# Patient Record
Sex: FEMALE | Race: BLACK OR AFRICAN AMERICAN | NOT HISPANIC OR LATINO
[De-identification: names, ages, dates, MRNs, and addresses within clinical notes are randomized per-mention and may not be internally consistent; named-entity substitution may affect disease eponyms.]

---

## 2017-02-16 PROBLEM — Z00.00 ENCOUNTER FOR PREVENTIVE HEALTH EXAMINATION: Status: ACTIVE | Noted: 2017-02-16

## 2017-02-23 ENCOUNTER — APPOINTMENT (OUTPATIENT)
Dept: HEART AND VASCULAR | Facility: CLINIC | Age: 57
End: 2017-02-23

## 2017-02-23 ENCOUNTER — OUTPATIENT (OUTPATIENT)
Dept: OUTPATIENT SERVICES | Facility: HOSPITAL | Age: 57
LOS: 1 days | End: 2017-02-23
Payer: MEDICAID

## 2017-02-23 ENCOUNTER — OUTPATIENT (OUTPATIENT)
Dept: OUTPATIENT SERVICES | Facility: HOSPITAL | Age: 57
LOS: 1 days | End: 2017-02-23

## 2017-02-23 VITALS — BODY MASS INDEX: 35.38 KG/M2 | WEIGHT: 220.13 LBS | HEIGHT: 66 IN

## 2017-02-23 VITALS — RESPIRATION RATE: 16 BRPM | HEART RATE: 78 BPM | DIASTOLIC BLOOD PRESSURE: 80 MMHG | SYSTOLIC BLOOD PRESSURE: 138 MMHG

## 2017-02-23 DIAGNOSIS — R07.9 CHEST PAIN, UNSPECIFIED: ICD-10-CM

## 2017-02-23 DIAGNOSIS — I10 ESSENTIAL (PRIMARY) HYPERTENSION: ICD-10-CM

## 2017-02-23 PROCEDURE — G0202: CPT | Mod: 26

## 2017-02-23 PROCEDURE — 77067 SCR MAMMO BI INCL CAD: CPT

## 2017-03-16 ENCOUNTER — FORM ENCOUNTER (OUTPATIENT)
Age: 57
End: 2017-03-16

## 2017-03-17 ENCOUNTER — OUTPATIENT (OUTPATIENT)
Dept: OUTPATIENT SERVICES | Facility: HOSPITAL | Age: 57
LOS: 1 days | End: 2017-03-17
Payer: MEDICAID

## 2017-03-17 DIAGNOSIS — R07.9 CHEST PAIN, UNSPECIFIED: ICD-10-CM

## 2017-03-17 PROCEDURE — A9500: CPT

## 2017-03-17 PROCEDURE — A9505: CPT

## 2017-03-17 PROCEDURE — 93017 CV STRESS TEST TRACING ONLY: CPT

## 2017-03-17 PROCEDURE — 93016 CV STRESS TEST SUPVJ ONLY: CPT

## 2017-03-17 PROCEDURE — 78452 HT MUSCLE IMAGE SPECT MULT: CPT

## 2017-03-17 PROCEDURE — 93018 CV STRESS TEST I&R ONLY: CPT

## 2017-03-17 PROCEDURE — 78452 HT MUSCLE IMAGE SPECT MULT: CPT | Mod: 26

## 2018-04-02 ENCOUNTER — EMERGENCY (EMERGENCY)
Facility: HOSPITAL | Age: 58
LOS: 1 days | Discharge: LEFT BEFORE DISCHARGE | End: 2018-04-02
Admitting: EMERGENCY MEDICINE

## 2018-04-02 VITALS
HEART RATE: 87 BPM | DIASTOLIC BLOOD PRESSURE: 87 MMHG | RESPIRATION RATE: 18 BRPM | SYSTOLIC BLOOD PRESSURE: 144 MMHG | OXYGEN SATURATION: 99 % | WEIGHT: 220.02 LBS | TEMPERATURE: 97 F

## 2018-04-02 DIAGNOSIS — I10 ESSENTIAL (PRIMARY) HYPERTENSION: ICD-10-CM

## 2018-04-02 NOTE — ED ADULT TRIAGE NOTE - OTHER COMPLAINTS
Reports symptoms are intermittent and started 3 days ago. Denies any nausea, vomiting, visual changes or chest pain.

## 2018-06-08 ENCOUNTER — EMERGENCY (EMERGENCY)
Facility: HOSPITAL | Age: 58
LOS: 1 days | Discharge: HOME | End: 2018-06-08
Admitting: EMERGENCY MEDICINE
Payer: COMMERCIAL

## 2018-06-08 VITALS
TEMPERATURE: 98 F | HEIGHT: 67 IN | DIASTOLIC BLOOD PRESSURE: 82 MMHG | SYSTOLIC BLOOD PRESSURE: 124 MMHG | RESPIRATION RATE: 18 BRPM | WEIGHT: 218.04 LBS | HEART RATE: 93 BPM | OXYGEN SATURATION: 100 %

## 2018-06-08 LAB — HIV 1+2 AB+HIV1 P24 AG SERPL QL IA: SIGNIFICANT CHANGE UP

## 2018-06-08 PROCEDURE — 36415 COLL VENOUS BLD VENIPUNCTURE: CPT

## 2018-06-08 PROCEDURE — 99284 EMERGENCY DEPT VISIT MOD MDM: CPT

## 2018-06-08 PROCEDURE — 87389 HIV-1 AG W/HIV-1&-2 AB AG IA: CPT

## 2018-06-08 PROCEDURE — 99283 EMERGENCY DEPT VISIT LOW MDM: CPT

## 2018-06-08 RX ORDER — SERTRALINE 25 MG/1
1 TABLET, FILM COATED ORAL
Qty: 14 | Refills: 0 | OUTPATIENT
Start: 2018-06-08 | End: 2018-06-21

## 2018-06-08 RX ORDER — QUETIAPINE FUMARATE 200 MG/1
1 TABLET, FILM COATED ORAL
Qty: 28 | Refills: 0 | OUTPATIENT
Start: 2018-06-08 | End: 2018-06-21

## 2018-06-08 NOTE — ED PROVIDER NOTE - NS ED ROS FT
CONSTITUTIONAL: No fever, chills, or weakness  NEURO: No headache, no dizziness, no syncope; No focal weakness/tingling/numbness  EYES: No visual changes  ENT: Mild runny nose. No sore throat.  PULM: No cough or dyspnea  CV: No chest pain or palpitations  GI: No abdominal pain, vomiting, or diarrhea  : No dysuria, hematuria, frequency  MSK: No neck pain or back pain, no joint pain  SKIN: no rash or unusual bruising

## 2018-06-08 NOTE — ED PROVIDER NOTE - OBJECTIVE STATEMENT
pt with reported hx of bipolar disorder requesting medication refill of quetiapine 50 mg BID and sertraline 100 mg daily.  She says she ran out of her medications about 8 days ago; she went to her doctor's office but she says the doctor is away and she did not find out when he will return.  She denies having any SI/HI/auditory or visual hallucinations, though she admits to having some anxiety.  She is asking if we can switch her medication to something she saw advertised on TV ([place]?).  She has no other complaints.

## 2018-06-08 NOTE — ED ADULT NURSE NOTE - OBJECTIVE STATEMENT
pt received in [facility] A & O x 3 pt states " my doctor is out of town and I need a refill for my Zoloft and Seroquel "

## 2018-06-08 NOTE — ED PROVIDER NOTE - MEDICAL DECISION MAKING DETAILS
Pt requesting refill of psych meds.  No s/s of acute psychosis or danger to self/others.  Will give 2 week supply until she can get a refill from her physician.

## 2018-06-08 NOTE — ED PROVIDER NOTE - PHYSICAL EXAMINATION
GEN: awake, alert, NAD  EYES: Clear, Pupils equal and round.  ENT: No rhinorrhea on exam.  PULM: Lungs clear  CV: RRR S1S2  GI: Abd soft, nontender  MSK: DING without difficulty  SKIN: normal color and turgor, no rash  NEURO: Alert, oriented x 3. DING normally.  Speech clear.  Gait steady.  PSYCH: Normal affect.  Mood: mildly anxious.  Denies SI, HI, and hallucinations.

## 2018-06-11 DIAGNOSIS — Z79.899 OTHER LONG TERM (CURRENT) DRUG THERAPY: ICD-10-CM

## 2018-06-11 DIAGNOSIS — Z76.0 ENCOUNTER FOR ISSUE OF REPEAT PRESCRIPTION: ICD-10-CM

## 2019-03-30 ENCOUNTER — EMERGENCY (EMERGENCY)
Facility: HOSPITAL | Age: 59
LOS: 1 days | Discharge: HOME | End: 2019-03-30
Attending: EMERGENCY MEDICINE | Admitting: EMERGENCY MEDICINE
Payer: MEDICARE

## 2019-03-30 VITALS
TEMPERATURE: 98 F | OXYGEN SATURATION: 100 % | SYSTOLIC BLOOD PRESSURE: 130 MMHG | HEART RATE: 77 BPM | DIASTOLIC BLOOD PRESSURE: 74 MMHG | WEIGHT: 238.1 LBS | RESPIRATION RATE: 18 BRPM

## 2019-03-30 VITALS
OXYGEN SATURATION: 100 % | DIASTOLIC BLOOD PRESSURE: 60 MMHG | SYSTOLIC BLOOD PRESSURE: 118 MMHG | HEART RATE: 83 BPM | TEMPERATURE: 98 F | RESPIRATION RATE: 18 BRPM

## 2019-03-30 LAB
ALBUMIN SERPL ELPH-MCNC: 3.9 G/DL — SIGNIFICANT CHANGE UP (ref 3.3–5)
ALP SERPL-CCNC: 94 U/L — SIGNIFICANT CHANGE UP (ref 40–120)
ALT FLD-CCNC: 14 U/L — SIGNIFICANT CHANGE UP (ref 10–45)
ANION GAP SERPL CALC-SCNC: 11 MMOL/L — SIGNIFICANT CHANGE UP (ref 5–17)
APTT BLD: 34.5 SEC — SIGNIFICANT CHANGE UP (ref 27.5–36.3)
AST SERPL-CCNC: 21 U/L — SIGNIFICANT CHANGE UP (ref 10–40)
BASOPHILS # BLD AUTO: 0.03 K/UL — SIGNIFICANT CHANGE UP (ref 0–0.2)
BASOPHILS NFR BLD AUTO: 0.5 % — SIGNIFICANT CHANGE UP (ref 0–2)
BILIRUB SERPL-MCNC: 0.4 MG/DL — SIGNIFICANT CHANGE UP (ref 0.2–1.2)
BUN SERPL-MCNC: 13 MG/DL — SIGNIFICANT CHANGE UP (ref 7–23)
CALCIUM SERPL-MCNC: 9.1 MG/DL — SIGNIFICANT CHANGE UP (ref 8.4–10.5)
CHLORIDE SERPL-SCNC: 105 MMOL/L — SIGNIFICANT CHANGE UP (ref 96–108)
CO2 SERPL-SCNC: 26 MMOL/L — SIGNIFICANT CHANGE UP (ref 22–31)
CREAT SERPL-MCNC: 0.76 MG/DL — SIGNIFICANT CHANGE UP (ref 0.5–1.3)
EOSINOPHIL # BLD AUTO: 0.1 K/UL — SIGNIFICANT CHANGE UP (ref 0–0.5)
EOSINOPHIL NFR BLD AUTO: 1.6 % — SIGNIFICANT CHANGE UP (ref 0–6)
GLUCOSE SERPL-MCNC: 110 MG/DL — HIGH (ref 70–99)
HCT VFR BLD CALC: 31.8 % — LOW (ref 34.5–45)
HGB BLD-MCNC: 9.8 G/DL — LOW (ref 11.5–15.5)
IMM GRANULOCYTES NFR BLD AUTO: 0.3 % — SIGNIFICANT CHANGE UP (ref 0–1.5)
INR BLD: 1.07 — SIGNIFICANT CHANGE UP (ref 0.88–1.16)
LYMPHOCYTES # BLD AUTO: 1.67 K/UL — SIGNIFICANT CHANGE UP (ref 1–3.3)
LYMPHOCYTES # BLD AUTO: 26.1 % — SIGNIFICANT CHANGE UP (ref 13–44)
MCHC RBC-ENTMCNC: 27.3 PG — SIGNIFICANT CHANGE UP (ref 27–34)
MCHC RBC-ENTMCNC: 30.8 GM/DL — LOW (ref 32–36)
MCV RBC AUTO: 88.6 FL — SIGNIFICANT CHANGE UP (ref 80–100)
MONOCYTES # BLD AUTO: 0.69 K/UL — SIGNIFICANT CHANGE UP (ref 0–0.9)
MONOCYTES NFR BLD AUTO: 10.8 % — SIGNIFICANT CHANGE UP (ref 2–14)
NEUTROPHILS # BLD AUTO: 3.89 K/UL — SIGNIFICANT CHANGE UP (ref 1.8–7.4)
NEUTROPHILS NFR BLD AUTO: 60.7 % — SIGNIFICANT CHANGE UP (ref 43–77)
NRBC # BLD: 0 /100 WBCS — SIGNIFICANT CHANGE UP (ref 0–0)
NT-PROBNP SERPL-SCNC: 18 PG/ML — SIGNIFICANT CHANGE UP (ref 0–300)
PLATELET # BLD AUTO: 378 K/UL — SIGNIFICANT CHANGE UP (ref 150–400)
POTASSIUM SERPL-MCNC: 4.2 MMOL/L — SIGNIFICANT CHANGE UP (ref 3.5–5.3)
POTASSIUM SERPL-SCNC: 4.2 MMOL/L — SIGNIFICANT CHANGE UP (ref 3.5–5.3)
PROT SERPL-MCNC: 8.2 G/DL — SIGNIFICANT CHANGE UP (ref 6–8.3)
PROTHROM AB SERPL-ACNC: 12.1 SEC — SIGNIFICANT CHANGE UP (ref 10–12.9)
RBC # BLD: 3.59 M/UL — LOW (ref 3.8–5.2)
RBC # FLD: 15.9 % — HIGH (ref 10.3–14.5)
SODIUM SERPL-SCNC: 142 MMOL/L — SIGNIFICANT CHANGE UP (ref 135–145)
WBC # BLD: 6.4 K/UL — SIGNIFICANT CHANGE UP (ref 3.8–10.5)
WBC # FLD AUTO: 6.4 K/UL — SIGNIFICANT CHANGE UP (ref 3.8–10.5)

## 2019-03-30 PROCEDURE — 85610 PROTHROMBIN TIME: CPT

## 2019-03-30 PROCEDURE — 71046 X-RAY EXAM CHEST 2 VIEWS: CPT

## 2019-03-30 PROCEDURE — 83880 ASSAY OF NATRIURETIC PEPTIDE: CPT

## 2019-03-30 PROCEDURE — 85730 THROMBOPLASTIN TIME PARTIAL: CPT

## 2019-03-30 PROCEDURE — 99284 EMERGENCY DEPT VISIT MOD MDM: CPT | Mod: 25

## 2019-03-30 PROCEDURE — 80053 COMPREHEN METABOLIC PANEL: CPT

## 2019-03-30 PROCEDURE — 71046 X-RAY EXAM CHEST 2 VIEWS: CPT | Mod: 26

## 2019-03-30 PROCEDURE — 99283 EMERGENCY DEPT VISIT LOW MDM: CPT | Mod: 25

## 2019-03-30 PROCEDURE — 36415 COLL VENOUS BLD VENIPUNCTURE: CPT

## 2019-03-30 PROCEDURE — 85025 COMPLETE CBC W/AUTO DIFF WBC: CPT

## 2019-03-30 NOTE — ED ADULT TRIAGE NOTE - CHIEF COMPLAINT QUOTE
pt complaining of b/l lower extremity swelling pain and tingling x 1 week with chest pain yesterday denies SOB N/V reports she has not been sleeping well and wants to talk with someone about it

## 2019-03-30 NOTE — ED PROVIDER NOTE - OBJECTIVE STATEMENT
60yo F hx of bipolar (not on meds), homeless sleeping in a shelter, here w/ complaint of bilateral leg swelling x 2 weeks. sleeping in a chair at shelter, has not elevated legs as not able to. walks all day. Also feels slight chest discomfort - worried about TB as pt is in a shelter. Denies radiation of pain, SOB, cough, immobilization, recent surgeries. No associated diaphoresis, N/V, decreased exercise capacity. Has a PCP that she follows up with but states is overdue to go back, trying to get her social situation rectified first. 60yo F hx of bipolar (not on meds), homeless sleeping in a shelter, here w/ complaint of bilateral leg swelling x 2 weeks. sleeping in a chair at shelter, has not elevated legs as not able to. walks all day. Also feels slight chest discomfort - worried about TB as pt is in a shelter. Denies radiation of pain, SOB, cough, immobilization, recent surgeries. No associated diaphoresis, N/V, decreased exercise capacity. Not pleuritic. Has a PCP that she follows up with but states is overdue to go back, trying to get her social situation rectified first.

## 2019-03-30 NOTE — ED PROVIDER NOTE - PHYSICAL EXAMINATION
CONSTITUTIONAL: Well-appearing; well-nourished; in no apparent distress.   HEAD: Normocephalic; atraumatic.   EYES:  conjunctiva and sclera clear  ENT: normal nose; no rhinorrhea; normal pharynx with no erythema or lesions.   NECK: Supple; non-tender;   CARDIOVASCULAR: Normal S1, S2; no murmurs, rubs, or gallops. Regular rate and rhythm.   RESPIRATORY: Breathing easily; breath sounds clear and equal bilaterally; no wheezes, rhonchi, or rales.  GI: Soft; non-distended; non-tender; no palpable organomegaly.   EXT: No cyanosis or edema; N/V intact  SKIN: Normal for age and race; warm; dry; good turgor; no apparent lesions or rash.   NEURO: A & O x 3; face symmetric; grossly unremarkable.   PSYCHOLOGICAL: The patient’s mood and manner are appropriate. CONSTITUTIONAL: Well-appearing; well-nourished; in no apparent distress.   HEAD: Normocephalic; atraumatic.   EYES:  conjunctiva and sclera clear  ENT: normal nose; no rhinorrhea; normal pharynx with no erythema or lesions.   NECK: Supple; non-tender;   CARDIOVASCULAR: Normal S1, S2; no murmurs, rubs, or gallops. Regular rate and rhythm.   RESPIRATORY: Breathing easily; breath sounds clear and equal bilaterally; no wheezes, rhonchi, or rales.  GI: Soft; non-distended; non-tender; no palpable organomegaly.   EXT: No cyanosis or erythema, bilateral legs w/ non pitting edema; N/V intact  SKIN: Normal for age and race; warm; dry; good turgor; no apparent lesions or rash.   NEURO: A & O x 3; face symmetric; grossly unremarkable.   PSYCHOLOGICAL: The patient’s mood and manner are appropriate.

## 2019-03-30 NOTE — ED PROVIDER NOTE - CLINICAL SUMMARY MEDICAL DECISION MAKING FREE TEXT BOX
here w/ chest discomfort and bilateral leg swelling. ekg wnl. trop and bnp neg. will check cxr and if neg, has a pcp she can follow up with. doubt acs as this time as has no risk factors and no other associated symptoms concerning for acs. in terms of social situation - has an appt with social work on monday.

## 2019-03-30 NOTE — ED PROVIDER NOTE - NSFOLLOWUPINSTRUCTIONS_ED_ALL_ED_FT
Peripheral Edema  Peripheral edema is swelling that is caused by a buildup of fluid. Peripheral edema most often affects the lower legs, ankles, and feet. It can also develop in the arms, hands, and face. The area of the body that has peripheral edema will look swollen. It may also feel heavy or warm. Your clothes may start to feel tight. Pressing on the area may make a temporary dent in your skin. You may not be able to move your arm or leg as much as usual.    There are many causes of peripheral edema. It can be a complication of other diseases, such as congestive heart failure, kidney disease, or a problem with your blood circulation. It also can be a side effect of certain medicines. It often happens to women during pregnancy. Sometimes, the cause is not known. Treating the underlying condition is often the only treatment for peripheral edema.    Follow these instructions at home:  Pay attention to any changes in your symptoms. Take these actions to help with your discomfort:    Raise (elevate) your legs while you are sitting or lying down.  Move around often to prevent stiffness and to lessen swelling. Do not sit or stand for long periods of time.  Wear support stockings as told by your health care provider.  Follow instructions from your health care provider about limiting salt (sodium) in your diet. Sometimes eating less salt can reduce swelling.  Take over-the-counter and prescription medicines only as told by your health care provider. Your health care provider may prescribe medicine to help your body get rid of excess water (diuretic).  Keep all follow-up visits as told by your health care provider. This is important.    Contact a health care provider if:  You have a fever.  Your edema starts suddenly or is getting worse, especially if you are pregnant or have a medical condition.  You have swelling in only one leg.  You have increased swelling and pain in your legs.  Get help right away if:  You develop shortness of breath, especially when you are lying down.  You have pain in your chest or abdomen.  You feel weak.  You faint.

## 2019-03-30 NOTE — ED ADULT NURSE NOTE - OBJECTIVE STATEMENT
Patient presents to the ED complaining of bilateral lower leg and feet swelling for the past week. Denies any fever or chills. Lower legs noted to be edematous 3+. Denies any fever or chills. Reports a cough. Hx of bipolar, does not take medication for two years, patient states that she is trying to treat it by herself. Denies SI/HI.

## 2019-04-03 DIAGNOSIS — R60.0 LOCALIZED EDEMA: ICD-10-CM

## 2019-04-03 DIAGNOSIS — M79.89 OTHER SPECIFIED SOFT TISSUE DISORDERS: ICD-10-CM

## 2019-04-03 DIAGNOSIS — Z79.899 OTHER LONG TERM (CURRENT) DRUG THERAPY: ICD-10-CM

## 2019-05-08 ENCOUNTER — EMERGENCY (EMERGENCY)
Facility: HOSPITAL | Age: 59
LOS: 1 days | Discharge: HOME | End: 2019-05-08
Attending: EMERGENCY MEDICINE | Admitting: EMERGENCY MEDICINE
Payer: MEDICARE

## 2019-05-08 VITALS
HEIGHT: 67 IN | WEIGHT: 218.04 LBS | OXYGEN SATURATION: 99 % | DIASTOLIC BLOOD PRESSURE: 81 MMHG | RESPIRATION RATE: 18 BRPM | HEART RATE: 80 BPM | SYSTOLIC BLOOD PRESSURE: 157 MMHG | TEMPERATURE: 98 F

## 2019-05-08 DIAGNOSIS — R19.7 DIARRHEA, UNSPECIFIED: ICD-10-CM

## 2019-05-08 DIAGNOSIS — R11.0 NAUSEA: ICD-10-CM

## 2019-05-08 DIAGNOSIS — Z79.899 OTHER LONG TERM (CURRENT) DRUG THERAPY: ICD-10-CM

## 2019-05-08 DIAGNOSIS — R10.13 EPIGASTRIC PAIN: ICD-10-CM

## 2019-05-08 DIAGNOSIS — R10.9 UNSPECIFIED ABDOMINAL PAIN: ICD-10-CM

## 2019-05-08 DIAGNOSIS — R35.0 FREQUENCY OF MICTURITION: ICD-10-CM

## 2019-05-08 DIAGNOSIS — Z98.890 OTHER SPECIFIED POSTPROCEDURAL STATES: ICD-10-CM

## 2019-05-08 PROCEDURE — 99284 EMERGENCY DEPT VISIT MOD MDM: CPT | Mod: 25

## 2019-05-08 NOTE — ED ADULT TRIAGE NOTE - ARRIVAL INFO ADDITIONAL COMMENTS
pt c/o 2 weeks of intermittent abd pain.  one episode of vomiting in the beginning now only occasional nausea.  also c/o hoarse voice that started today.

## 2019-05-08 NOTE — ED ADULT NURSE NOTE - NSIMPLEMENTINTERV_GEN_ALL_ED
Implemented All Universal Safety Interventions:  [place] to call system. Call bell, personal items and telephone within reach. Instruct patient to call for assistance. Room bathroom lighting operational. Non-slip footwear when patient is off stretcher. Physically safe environment: no spills, clutter or unnecessary equipment. Stretcher in lowest position, wheels locked, appropriate side rails in place.

## 2019-05-09 VITALS
SYSTOLIC BLOOD PRESSURE: 150 MMHG | OXYGEN SATURATION: 99 % | RESPIRATION RATE: 18 BRPM | HEART RATE: 82 BPM | DIASTOLIC BLOOD PRESSURE: 79 MMHG | TEMPERATURE: 98 F

## 2019-05-09 DIAGNOSIS — Z98.84 BARIATRIC SURGERY STATUS: Chronic | ICD-10-CM

## 2019-05-09 PROBLEM — F31.9 BIPOLAR DISORDER, UNSPECIFIED: Chronic | Status: ACTIVE | Noted: 2019-03-30

## 2019-05-09 LAB
ALBUMIN SERPL ELPH-MCNC: 4 G/DL — SIGNIFICANT CHANGE UP (ref 3.3–5)
ALP SERPL-CCNC: 97 U/L — SIGNIFICANT CHANGE UP (ref 40–120)
ALT FLD-CCNC: 11 U/L — SIGNIFICANT CHANGE UP (ref 10–45)
ANION GAP SERPL CALC-SCNC: 10 MMOL/L — SIGNIFICANT CHANGE UP (ref 5–17)
AST SERPL-CCNC: 26 U/L — SIGNIFICANT CHANGE UP (ref 10–40)
BASOPHILS # BLD AUTO: 0.02 K/UL — SIGNIFICANT CHANGE UP (ref 0–0.2)
BASOPHILS NFR BLD AUTO: 0.3 % — SIGNIFICANT CHANGE UP (ref 0–2)
BILIRUB SERPL-MCNC: 0.3 MG/DL — SIGNIFICANT CHANGE UP (ref 0.2–1.2)
BUN SERPL-MCNC: 13 MG/DL — SIGNIFICANT CHANGE UP (ref 7–23)
CALCIUM SERPL-MCNC: 9.1 MG/DL — SIGNIFICANT CHANGE UP (ref 8.4–10.5)
CHLORIDE SERPL-SCNC: 104 MMOL/L — SIGNIFICANT CHANGE UP (ref 96–108)
CO2 SERPL-SCNC: 27 MMOL/L — SIGNIFICANT CHANGE UP (ref 22–31)
CREAT SERPL-MCNC: 0.92 MG/DL — SIGNIFICANT CHANGE UP (ref 0.5–1.3)
EOSINOPHIL # BLD AUTO: 0.08 K/UL — SIGNIFICANT CHANGE UP (ref 0–0.5)
EOSINOPHIL NFR BLD AUTO: 1.1 % — SIGNIFICANT CHANGE UP (ref 0–6)
GLUCOSE SERPL-MCNC: 130 MG/DL — HIGH (ref 70–99)
HCT VFR BLD CALC: 33.4 % — LOW (ref 34.5–45)
HGB BLD-MCNC: 10.3 G/DL — LOW (ref 11.5–15.5)
IMM GRANULOCYTES NFR BLD AUTO: 0.3 % — SIGNIFICANT CHANGE UP (ref 0–1.5)
LIDOCAIN IGE QN: 19 U/L — SIGNIFICANT CHANGE UP (ref 7–60)
LYMPHOCYTES # BLD AUTO: 2.34 K/UL — SIGNIFICANT CHANGE UP (ref 1–3.3)
LYMPHOCYTES # BLD AUTO: 31 % — SIGNIFICANT CHANGE UP (ref 13–44)
MCHC RBC-ENTMCNC: 27.2 PG — SIGNIFICANT CHANGE UP (ref 27–34)
MCHC RBC-ENTMCNC: 30.8 GM/DL — LOW (ref 32–36)
MCV RBC AUTO: 88.1 FL — SIGNIFICANT CHANGE UP (ref 80–100)
MONOCYTES # BLD AUTO: 0.88 K/UL — SIGNIFICANT CHANGE UP (ref 0–0.9)
MONOCYTES NFR BLD AUTO: 11.7 % — SIGNIFICANT CHANGE UP (ref 2–14)
NEUTROPHILS # BLD AUTO: 4.21 K/UL — SIGNIFICANT CHANGE UP (ref 1.8–7.4)
NEUTROPHILS NFR BLD AUTO: 55.6 % — SIGNIFICANT CHANGE UP (ref 43–77)
NRBC # BLD: 0 /100 WBCS — SIGNIFICANT CHANGE UP (ref 0–0)
PLATELET # BLD AUTO: 367 K/UL — SIGNIFICANT CHANGE UP (ref 150–400)
POTASSIUM SERPL-MCNC: 4.1 MMOL/L — SIGNIFICANT CHANGE UP (ref 3.5–5.3)
POTASSIUM SERPL-SCNC: 4.1 MMOL/L — SIGNIFICANT CHANGE UP (ref 3.5–5.3)
PROT SERPL-MCNC: 7.7 G/DL — SIGNIFICANT CHANGE UP (ref 6–8.3)
RBC # BLD: 3.79 M/UL — LOW (ref 3.8–5.2)
RBC # FLD: 16.2 % — HIGH (ref 10.3–14.5)
SODIUM SERPL-SCNC: 141 MMOL/L — SIGNIFICANT CHANGE UP (ref 135–145)
WBC # BLD: 7.55 K/UL — SIGNIFICANT CHANGE UP (ref 3.8–10.5)
WBC # FLD AUTO: 7.55 K/UL — SIGNIFICANT CHANGE UP (ref 3.8–10.5)

## 2019-05-09 PROCEDURE — 85025 COMPLETE CBC W/AUTO DIFF WBC: CPT

## 2019-05-09 PROCEDURE — 74177 CT ABD & PELVIS W/CONTRAST: CPT

## 2019-05-09 PROCEDURE — 36415 COLL VENOUS BLD VENIPUNCTURE: CPT

## 2019-05-09 PROCEDURE — 83690 ASSAY OF LIPASE: CPT

## 2019-05-09 PROCEDURE — 80053 COMPREHEN METABOLIC PANEL: CPT

## 2019-05-09 PROCEDURE — 74177 CT ABD & PELVIS W/CONTRAST: CPT | Mod: 26

## 2019-05-09 PROCEDURE — 99284 EMERGENCY DEPT VISIT MOD MDM: CPT

## 2019-05-09 RX ORDER — IOHEXOL 300 MG/ML
30 INJECTION, SOLUTION INTRAVENOUS ONCE
Refills: 0 | Status: COMPLETED | OUTPATIENT
Start: 2019-05-09 | End: 2019-05-09

## 2019-05-09 RX ORDER — OMEPRAZOLE 10 MG/1
1 CAPSULE, DELAYED RELEASE ORAL
Qty: 14 | Refills: 0
Start: 2019-05-09 | End: 2019-05-22

## 2019-05-09 RX ADMIN — IOHEXOL 30 MILLILITER(S): 300 INJECTION, SOLUTION INTRAVENOUS at 00:51

## 2019-05-09 NOTE — ED PROVIDER NOTE - NSFOLLOWUPCLINICS_GEN_ALL_ED_FT
[facility]  Family Medicine  [facility]. [address]  [place], [place] [facility]  Phone: [phone]  Fax:   Follow Up Time:

## 2019-05-09 NOTE — ED PROVIDER NOTE - CLINICAL SUMMARY MEDICAL DECISION MAKING FREE TEXT BOX
56 yo female with hx of gastric bypass with int abd pain, nausea, diarrhea for 3 days.  will check labs, ct abd 56 yo female with hx of gastric bypass with int abd pain, nausea, diarrhea for 3 days.  will check labs, ct abd--Offered admission and stressed follow up for pancreatic studies -> states feeling improved

## 2019-05-09 NOTE — ED PROVIDER NOTE - OBJECTIVE STATEMENT
about 3 days of int upper abd pain, nausea  also diarrhea  no fever  hx of gastric bypass 11 years ago

## 2019-07-09 ENCOUNTER — EMERGENCY (EMERGENCY)
Facility: HOSPITAL | Age: 59
LOS: 1 days | Discharge: HOME | End: 2019-07-09
Attending: EMERGENCY MEDICINE | Admitting: EMERGENCY MEDICINE
Payer: MEDICARE

## 2019-07-09 VITALS
HEIGHT: 67 IN | SYSTOLIC BLOOD PRESSURE: 155 MMHG | DIASTOLIC BLOOD PRESSURE: 82 MMHG | TEMPERATURE: 98 F | OXYGEN SATURATION: 100 % | WEIGHT: 229.06 LBS | RESPIRATION RATE: 16 BRPM | HEART RATE: 81 BPM

## 2019-07-09 VITALS
RESPIRATION RATE: 16 BRPM | SYSTOLIC BLOOD PRESSURE: 147 MMHG | HEART RATE: 77 BPM | DIASTOLIC BLOOD PRESSURE: 80 MMHG | OXYGEN SATURATION: 98 % | TEMPERATURE: 98 F

## 2019-07-09 DIAGNOSIS — Z98.84 BARIATRIC SURGERY STATUS: Chronic | ICD-10-CM

## 2019-07-09 LAB
APPEARANCE UR: CLEAR — SIGNIFICANT CHANGE UP
BILIRUB UR-MCNC: NEGATIVE — SIGNIFICANT CHANGE UP
COLOR SPEC: YELLOW — SIGNIFICANT CHANGE UP
DIFF PNL FLD: NEGATIVE — SIGNIFICANT CHANGE UP
GLUCOSE UR QL: NEGATIVE — SIGNIFICANT CHANGE UP
KETONES UR-MCNC: NEGATIVE — SIGNIFICANT CHANGE UP
LEUKOCYTE ESTERASE UR-ACNC: NEGATIVE — SIGNIFICANT CHANGE UP
NITRITE UR-MCNC: NEGATIVE — SIGNIFICANT CHANGE UP
PH UR: 6 — SIGNIFICANT CHANGE UP (ref 5–8)
PROT UR-MCNC: NEGATIVE MG/DL — SIGNIFICANT CHANGE UP
SP GR SPEC: 1.02 — SIGNIFICANT CHANGE UP (ref 1–1.03)
UROBILINOGEN FLD QL: 1 E.U./DL — SIGNIFICANT CHANGE UP

## 2019-07-09 PROCEDURE — 81003 URINALYSIS AUTO W/O SCOPE: CPT

## 2019-07-09 PROCEDURE — 99283 EMERGENCY DEPT VISIT LOW MDM: CPT

## 2019-07-09 PROCEDURE — 87086 URINE CULTURE/COLONY COUNT: CPT

## 2019-07-09 NOTE — ED PROVIDER NOTE - PROGRESS NOTE DETAILS
performed external gu exam with nurse present pt now wants to give urine sample, states she needs 10 minutes, will sign out to pradeep ro and dr ashby to f/u on results.

## 2019-07-09 NOTE — ED PROVIDER NOTE - PHYSICAL EXAMINATION
gen: pt is sleeping,   HEENT: oropharynx clear  Neck: supple, no meningismus, no bruit noted bl carotid  CV: rrr no m/r/g 2+ radial pulse  Resp: ctab, no w/c/r  Abd: nontender, no rebound/guarding  : nl external genitalia, pt declined speculum examination no e/o prolapse.   Ext: no edema, pedal pulses 2+  Neuro: alert and oriented, cn grossly intact, strength equal in all 4 ext, sensation intact to light touch f/a/l, nl coordination, gait steady gen: pt is sleeping,   HEENT: oropharynx clear  Neck: supple, no meningismus, no bruit noted bl carotid  CV: rrr no m/r/g 2+ radial pulse  Resp: ctab, no w/c/r  Abd: nontender, no rebound/guarding  : nl external genitalia, no erythema no lesions no rash no mass no discharge no e/o prolapse  pt declined speculum examination no e/o prolapse.   Ext: no edema, pedal pulses 2+  Neuro: alert and oriented, cn grossly intact, strength equal in all 4 ext, sensation intact to light touch f/a/l, nl coordination, gait steady

## 2019-07-09 NOTE — ED PROVIDER NOTE - OBJECTIVE STATEMENT
59F with hx of bipolar mood d/o s/p gastric bypass, had CT scan 2 months ago that showed concern for pancreatic mass presenting with "mass to my vagina" no vaginal discharge, no dysuria, no hematuria, pt has not followed up with gynecology although she states that "this problem has been going on for a long time". "The mass comes and then it disappears. No vaginal bleeding, no recent LMP, pt is post-menopausal.

## 2019-07-09 NOTE — ED ADULT NURSE NOTE - NSIMPLEMENTINTERV_GEN_ALL_ED
Implemented All Universal Safety Interventions:  [place] to call system. Call bell, personal items and telephone within reach. Instruct patient to call for assistance. Room bathroom lighting operational. Non-slip footwear when patient is off stretcher. Physically safe environment: no spills, clutter or unnecessary equipment. Stretcher in lowest position, wheels locked, appropriate side rails in place.

## 2019-07-09 NOTE — ED ADULT NURSE NOTE - OBJECTIVE STATEMENT
Presents to ED for vaginal pain. Presents to ED for vaginal pain due to mass.  Patient reports, "I have the mass in my vagina that comes and then it disappears.  It's been happening for a long time."  Denies any vaginal discharge, dysuria, hematuria, vaginal bleeding, CP, SOB, abd pain, n/v, fevers, or chills.  Patient found sleeping in stretcher comfortably with no signs or symptoms of distress.  When asked for urine sample patient reported using bathroom in waiting room and could not produce another sample at this time.  Warm water was given to patient by MD and RN.  Patient found sleeping comfortably every time encountered for urine sample.

## 2019-07-09 NOTE — ED PROVIDER NOTE - CLINICAL SUMMARY MEDICAL DECISION MAKING FREE TEXT BOX
59F with vaginal pain for months, describes mass at times that goes back in can consider prolapse, abd nontender, pt was eating in waiting room, abd nontender no nausea, no vomiting, feels fine. plan for gyn f/u, will dc home. Pt is refusing to give urine sample, refusing internal vaginal exam, wants to sleep in ER.

## 2019-07-09 NOTE — ED PROVIDER NOTE - NSFOLLOWUPINSTRUCTIONS_ED_ALL_ED_FT
Follow up with gynecology regarding possible vaginal prolapse, return to the ER for any fever, vomiting, or abdominal pain.

## 2019-07-09 NOTE — ED ADULT NURSE NOTE - DOES PATIENT HAVE ADVANCE DIRECTIVE
Quality 431: Preventive Care And Screening: Unhealthy Alcohol Use - Screening: Patient screened for unhealthy alcohol use using a single question and scores less than 2 times per year
Quality 265: Biopsy Follow-Up: Biopsy results reviewed, communicated, tracked, and documented
Quality 226: Preventive Care And Screening: Tobacco Use: Screening And Cessation Intervention: Patient screened for tobacco and never smoked
Detail Level: Detailed
No

## 2019-07-10 LAB
CULTURE RESULTS: SIGNIFICANT CHANGE UP
SPECIMEN SOURCE: SIGNIFICANT CHANGE UP

## 2019-07-13 DIAGNOSIS — R10.2 PELVIC AND PERINEAL PAIN: ICD-10-CM

## 2019-07-13 DIAGNOSIS — Z79.899 OTHER LONG TERM (CURRENT) DRUG THERAPY: ICD-10-CM

## 2019-07-24 ENCOUNTER — EMERGENCY (EMERGENCY)
Facility: HOSPITAL | Age: 59
LOS: 1 days | Discharge: HOME | End: 2019-07-24
Attending: EMERGENCY MEDICINE | Admitting: EMERGENCY MEDICINE
Payer: MEDICARE

## 2019-07-24 VITALS
HEART RATE: 90 BPM | DIASTOLIC BLOOD PRESSURE: 83 MMHG | RESPIRATION RATE: 16 BRPM | WEIGHT: 220.02 LBS | TEMPERATURE: 98 F | OXYGEN SATURATION: 99 % | HEIGHT: 67 IN | SYSTOLIC BLOOD PRESSURE: 153 MMHG

## 2019-07-24 DIAGNOSIS — Z98.84 BARIATRIC SURGERY STATUS: Chronic | ICD-10-CM

## 2019-07-24 PROCEDURE — 99283 EMERGENCY DEPT VISIT LOW MDM: CPT

## 2019-07-24 PROCEDURE — 99282 EMERGENCY DEPT VISIT SF MDM: CPT

## 2019-07-24 RX ORDER — QUETIAPINE FUMARATE 200 MG/1
50 TABLET, FILM COATED ORAL ONCE
Refills: 0 | Status: COMPLETED | OUTPATIENT
Start: 2019-07-24 | End: 2019-07-24

## 2019-07-24 NOTE — ED PROVIDER NOTE - PHYSICAL EXAMINATION
GEN: Well appearing, well nourished, awake, alert, oriented to person, place, time/situation and in no apparent distress.  ENT: Airway patent, Nasal mucosa clear. Mouth with normal mucosa.  EYES: Clear bilaterally.  RESPIRATORY: Breathing comfortably with normal RR.  CARDIAC: Regular rate and rhythm  ABDOMEN: Soft, nontender, +bowel sounds, no rebound, rigidity, or guarding.  MSK: Range of motion is not limited, no deformities noted.  NEURO: Alert and oriented, no focal deficits.  SKIN: Skin normal color for race, warm, dry and intact. No evidence of rash.  PSYCH: Alert and oriented to person, place, time/situation. somewhat anxious mood and affect. Somewhat paranoid. Fluent speech. Cooperative. No psychosis. no apparent risk to self or others.

## 2019-07-24 NOTE — ED PROVIDER NOTE - NSFOLLOWUPCLINICS_GEN_ALL_ED_FT
[facility]  Family Medicine  [facility]. [address]  [place], [place] [facility]  Phone: [phone]  Fax:   Follow Up Time:

## 2019-07-24 NOTE — ED ADULT TRIAGE NOTE - CHIEF COMPLAINT QUOTE
Presents to ED for psych evaluation.  Patient reports she has questions regarding "something that happened to her,"  but refused to disclose what occurred.  Patient denies SI/HI, auditory/visual/tactile hallucination.  No signs or symptoms of distress at this time.
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
## 2019-07-24 NOTE — ED PROVIDER NOTE - NSFOLLOWUPINSTRUCTIONS_ED_ALL_ED_FT
Please follow up with your primary care physician. If you have any problem getting an appointment this week, please call the ED Referral Coordinator at [phone].  Return to the Emergency Department if you have any new or worsening symptoms, or for any other concerns. Please read below for further information.  Living With Bipolar Disorder  If you have been diagnosed with bipolar disorder, you may be relieved that you now know why you have felt or behaved a certain way. You may also feel overwhelmed about the treatment ahead, how to get the support you need, and how to deal with the condition day-to-day. With care and support, you can learn to manage your symptoms and live with bipolar disorder.    How to manage lifestyle changes  Managing stress     ImageStress is your body's reaction to life changes and events, both good and bad. Stress can play a major role in bipolar disorder, so it is important to learn how to cope with stress. Some techniques to cope with stress include:  Meditation, muscle relaxation, and breathing exercises.  Exercise. Even a short daily walk can help to lower stress levels.  Getting enough good-quality sleep. Too little sleep can cause gerardo to start (can trigger gerardo).  Making a schedule to manage your time. Knowing your daily schedule can help to keep you from feeling overwhelmed by tasks and deadlines.  Spending time on hobbies that you enjoy.  Medicines     Your health care provider may suggest certain medicines if he or she feels that they will help improve your condition. Avoid using caffeine, alcohol, and other substances that may prevent your medicines from working properly (may interact). It is also important to:  Talk with your pharmacist or health care provider about all the medicines that you take, their possible side effects, and which medicines are safe to take together.  Make it your goal to take part in all treatment decisions (shared decision-making). Ask about possible side effects of medicines that your health care provider recommends, and tell him or her how you feel about having those side effects. It is best if shared decision-making with your health care provider is part of your total treatment plan.  If you are taking medicines as part of your treatment, do not stop taking medicines before you ask your health care provider if it is safe to stop. You may need to have the medicine slowly decreased (tapered) over time to decrease the risk of harmful side effects.    Relationships    ImageSpend time with people that you trust and with whom you feel a sense of understanding and calm. Try to find friends or family members who make you feel safe and can help you control feelings of gerardo. Consider going to couples counseling, family education classes, or family therapy to:  Educate your loved ones about your condition and offer suggestions about how they can support you.  Help resolve conflicts.  Help develop communication skills in your relationships.  How to recognize changes in your condition  Everyone responds differently to treatment for bipolar disorder. Some signs that your condition is improving include:  Leveling of your mood. You may have less anger and excitement about daily activities, and your low moods may not be as bad.  Your symptoms being less intense.  Feeling calm more often.  Thinking clearly.  Not experiencing consequences for extreme behavior.  Feeling like your life is settling down.  Your behavior seeming more normal to you and to other people.  Some signs that your condition may be getting worse include:  Sleep problems.  Moods cycling between deep lows and unusually high (excess) energy.  Extreme emotions.  More anger at loved ones.  Staying away from others (isolating yourself).  A feeling of power or superiority.  Completing a lot of tasks in a very short amount of time.  Unusual thoughts and behaviors.  Suicidal thoughts.  Where to find support  Talking to others     Try making a list of the people you may want to tell about your condition, such as the people you trust most.  Plan what you are willing to talk about and what you do not want to discuss. Think about your needs ahead of time, and how your friends and family members can support you.  Let your loved ones know when they can share advice and when you would just like them to listen.   Give your loved ones information about bipolar disorder, and encourage them to learn about the condition.  Finances     Not all insurance plans cover mental health care, so it is important to check with your insurance carrier. If paying for co-pays or counseling services is a problem, search for a local or [facility] mental health care center. Public mental health care services may be offered there at a low cost or no cost when you are not able to see a private health care provider.    If you are taking medicine for depression, you may be able to get the generic form, which may be less expensive than brand-name medicine. Some makers of prescription medicines also offer help to patients who cannot afford the medicines they need.    Follow these instructions at home:  Medicines     Take over-the-counter and prescription medicines only as told by your health care provider or pharmacist.  Ask your pharmacist what over-the-counter cold medicines you should avoid. Some medicines can make symptoms worse.  General instructions     Ask for support from trusted family members or friends to make sure you stay on track with your treatment.  Keep a journal to write down your daily moods, medicines, sleep habits, and life events. This may help you have more success with your treatment.  Make and follow a routine for daily meal times. Eat healthy foods, such as whole grains, vegetables, and fresh fruit.  Try to go to sleep and wake up around the same time every day.  Keep all follow-up visits as told by your health care provider. This is important.  Questions to ask your health care provider:  If you are taking medicines:  How long do I need to take medicine?  Are there any long-term side effects of my medicine?  Are there any alternatives to taking medicine?  How would I benefit from therapy?  How often should I follow up with a health care provider?  Contact a health care provider if:  Your symptoms get worse or they do not get better with treatment.  Get help right away if:  You have thoughts about harming yourself or others.  If you ever feel like you may hurt yourself or others, or have thoughts about taking your own life, get help right away. You can go to your nearest emergency department or call:   Your local emergency services (911 in the [place]   A suicide crisis helpline, such as the National Suicide Prevention Lifeline at 1-423.863.6876. This is open 24-hours a day.   Summary  Learning ways to deal with stress can help to calm you and may also help your treatment work better.  There is a wide range of medicines that can help to treat bipolar disorder.  Having healthy relationships can help to make your moods more stable.  Contact a health care provider if your symptoms get worse or they do not get better with treatment.  This information is not intended to replace advice given to you by your health care provider. Make sure you discuss any questions you have with your health care provider.

## 2019-07-24 NOTE — ED PROVIDER NOTE - CLINICAL SUMMARY MEDICAL DECISION MAKING FREE TEXT BOX
59F with above PMHX who p/w anxiety over loss of belongings and living situation. She has no physical complaints. She is not psychotic, no SI/HI. Pt was given resources for shelter and  clinic for follow up. home dose of risperidone given. Pt is stable for DC. pt agreeable to the discharge plan.  At this time, the evidence for any other entities in the differential is insufficient to justify any further testing. This was discussed and explained to the patient. It was advised that persistent or worsening symptoms would require further evaluation. This was discussed with the patient and family using shared decision making. ED evaluation and management discussed in detail. Pt does not require emergent psych eval based on her presentation. Close PMD follow up encouraged.  Strict ED return instructions discussed in detail and patient given the opportunity to ask any questions about their discharge diagnosis and instructions. Patient verbalized understanding.

## 2019-07-28 DIAGNOSIS — F31.89 OTHER BIPOLAR DISORDER: ICD-10-CM

## 2019-07-28 DIAGNOSIS — Z79.899 OTHER LONG TERM (CURRENT) DRUG THERAPY: ICD-10-CM

## 2019-07-28 DIAGNOSIS — F41.9 ANXIETY DISORDER, UNSPECIFIED: ICD-10-CM

## 2019-08-12 ENCOUNTER — EMERGENCY (EMERGENCY)
Facility: HOSPITAL | Age: 59
LOS: 1 days | Discharge: HOME | End: 2019-08-12
Admitting: EMERGENCY MEDICINE
Payer: MEDICARE

## 2019-08-12 VITALS
HEART RATE: 74 BPM | TEMPERATURE: 98 F | OXYGEN SATURATION: 99 % | RESPIRATION RATE: 19 BRPM | SYSTOLIC BLOOD PRESSURE: 111 MMHG | DIASTOLIC BLOOD PRESSURE: 69 MMHG

## 2019-08-12 VITALS
TEMPERATURE: 98 F | HEART RATE: 88 BPM | RESPIRATION RATE: 16 BRPM | WEIGHT: 220.02 LBS | DIASTOLIC BLOOD PRESSURE: 71 MMHG | SYSTOLIC BLOOD PRESSURE: 127 MMHG | OXYGEN SATURATION: 98 %

## 2019-08-12 DIAGNOSIS — Z98.84 BARIATRIC SURGERY STATUS: Chronic | ICD-10-CM

## 2019-08-12 PROCEDURE — 99283 EMERGENCY DEPT VISIT LOW MDM: CPT

## 2019-08-12 RX ORDER — FAMOTIDINE 10 MG/ML
1 INJECTION INTRAVENOUS
Qty: 5 | Refills: 0
Start: 2019-08-12 | End: 2019-08-16

## 2019-08-12 RX ORDER — FAMOTIDINE 10 MG/ML
20 INJECTION INTRAVENOUS ONCE
Refills: 0 | Status: COMPLETED | OUTPATIENT
Start: 2019-08-12 | End: 2019-08-12

## 2019-08-12 RX ORDER — DIPHENHYDRAMINE HCL 50 MG
1 CAPSULE ORAL
Qty: 15 | Refills: 0
Start: 2019-08-12 | End: 2019-08-16

## 2019-08-12 RX ORDER — DIPHENHYDRAMINE HCL 50 MG
25 CAPSULE ORAL ONCE
Refills: 0 | Status: COMPLETED | OUTPATIENT
Start: 2019-08-12 | End: 2019-08-12

## 2019-08-12 RX ADMIN — FAMOTIDINE 20 MILLIGRAM(S): 10 INJECTION INTRAVENOUS at 02:43

## 2019-08-12 RX ADMIN — Medication 25 MILLIGRAM(S): at 02:43

## 2019-08-12 NOTE — ED ADULT TRIAGE NOTE - CHIEF COMPLAINT QUOTE
pt complaining of itching to b/l arms and head x 2 days with dry skin and rash no hives or pain no known allergies new detergents or medications no bug bites

## 2019-08-12 NOTE — ED PROVIDER NOTE - CLINICAL SUMMARY MEDICAL DECISION MAKING FREE TEXT BOX
Patient with  pruritus no focal rash or lesion. Patent airway and CTAB. Recommend stop cream and f/u with allergist.

## 2019-08-12 NOTE — ED PROVIDER NOTE - NS ED MD EM SELECTION
Chief complaint:   Chief Complaint   Patient presents with   • New Patient       Vitals:  Visit Vitals  /68   Pulse 84   Temp 97.4 °F (36.3 °C) (Oral)   Resp 16   Wt 85.8 kg   BMI 29.63 kg/m²       HISTORY OF PRESENT ILLNESS     This is a 87-year-old gentleman here to see me in referral from Dr. Robins. This patient has intermittent tenderness in the right groin. The family thinks he might have had a hernia surgery before but do not know which side. He is not the best historian. He is awake and alert he uses a walker. He denies any chest pain or shortness of breath. He denies any recent MI. He has a history of irritable bowel. Denies rectal bleeding.      Other significant problems:  Patient Active Problem List    Diagnosis Date Noted   • Gait instability 01/10/2019     Priority: Low   • Abnormal gait 02/24/2016     Priority: Low   • SDH (subdural hematoma) (CMS/[facility]) 02/24/2016     Priority: Low   • TBI (traumatic brain injury) (CMS/[facility]) 02/24/2016     Priority: Low       PAST MEDICAL, FAMILY AND SOCIAL HISTORY     Medications:  Current Outpatient Medications   Medication   • polyethylene glycol (MIRALAX) powder   • aspirin 81 MG chewable tablet   • atorvastatin (LIPITOR) 10 MG tablet   • azelastine (ASTELIN) 0.1 % nasal spray   • diclofenac (VOLTAREN) 1 % gel   • fluticasone (FLONASE) 50 MCG/ACT nasal spray   • methylPREDNISolone (MEDROL) 4 MG tablet   • methylPREDNISolone (MEDROL) 4 MG tablet   • methylPREDNISolone (MEDROL) 4 MG tablet   • saccharomyces boulardii (FLORASTOR) 250 MG capsule   • cefUROXime (CEFTIN) 500 MG tablet     No current facility-administered medications for this visit.        Allergies:  ALLERGIES:  No Known Allergies    Past Medical  History/Surgeries:  Past Medical History:   Diagnosis Date   • Arthritis        Past Surgical History:   Procedure Laterality Date   • Hernia repair     • Hip surgery     • Joint replacement      bilat hips       Family History:  No family history on  file.    Social History:  Social History     Tobacco Use   • Smoking status: Never Smoker   • Smokeless tobacco: Never Used   Substance Use Topics   • Alcohol use: No       REVIEW OF SYSTEMS     Review of Systems   Constitutional: Negative.    HENT: Negative.    Eyes: Negative.    Respiratory: Negative.    Gastrointestinal: Positive for diarrhea. Negative for abdominal distention, abdominal pain, anal bleeding, blood in stool and constipation.   Genitourinary: Negative.    Musculoskeletal: Positive for gait problem. Negative for arthralgias and back pain.   Hematological: Negative.    Psychiatric/Behavioral: Negative.        PHYSICAL EXAM     Physical Exam   Constitutional: He is oriented to person, place, and time. He appears well-nourished.   Cardiovascular: Normal rate and regular rhythm.   Pulmonary/Chest: Effort normal. No respiratory distress. He has no wheezes. He has no rales. He exhibits no tenderness.   Abdominal: Soft. He exhibits no distension and no mass. There is no tenderness. There is no rebound and no guarding.   In the right groin he has a tender hernia. With a fair amount of pressure unable to reduce the hernia. The CT scan is reviewed on the CT scan he has a right inguinal hernia with a possible bowel loop. I could not visualize any obvious scar.   Musculoskeletal: Normal range of motion.   Neurological: He is alert and oriented to person, place, and time.   Skin: Skin is warm and dry.   Psychiatric: He has a normal mood and affect.   Vitals reviewed.      ASSESSMENT/PLAN     Symptomatic right inguinal hernia with the temporary incarceration. I would recommend a right inguinal hernia repair. This could be a recurrent hernia. We will attempt to repair this with a MAC anesthesia and local. He will need preoperative clearance with his primary physician. Risks and benefits surgery were discussed including risk of bleeding infection scarring and pain.   [address] Exp Problem Focused - Mod. Complex

## 2019-08-12 NOTE — ED ADULT NURSE NOTE - NSIMPLEMENTINTERV_GEN_ALL_ED
Implemented All Universal Safety Interventions:  [place] to call system. Call bell, personal items and telephone within reach. Instruct patient to call for assistance. Room bathroom lighting operational. Non-slip footwear when patient is off stretcher. Physically safe environment: no spills, clutter or unnecessary equipment. Stretcher in lowest position, wheels locked, appropriate side rails in place.

## 2019-08-12 NOTE — ED PROVIDER NOTE - OBJECTIVE STATEMENT
60 y/o f with of bipolar disorder present to ED c/o pruritus for two days all over. Admit to also being pruritic also at the scalp. Possible new cream she reported  but unsure why her scalp itches. Denies new living accommodation, travel, new food, detergent, construction. Admit to no rash.

## 2019-08-15 DIAGNOSIS — L29.9 PRURITUS, UNSPECIFIED: ICD-10-CM

## 2019-09-27 ENCOUNTER — EMERGENCY (EMERGENCY)
Facility: HOSPITAL | Age: 59
LOS: 1 days | Discharge: HOME | End: 2019-09-27
Attending: EMERGENCY MEDICINE | Admitting: EMERGENCY MEDICINE
Payer: MEDICARE

## 2019-09-27 VITALS
DIASTOLIC BLOOD PRESSURE: 84 MMHG | WEIGHT: 222.01 LBS | SYSTOLIC BLOOD PRESSURE: 164 MMHG | TEMPERATURE: 99 F | HEART RATE: 84 BPM | HEIGHT: 67 IN | OXYGEN SATURATION: 98 % | RESPIRATION RATE: 18 BRPM

## 2019-09-27 VITALS
HEART RATE: 78 BPM | RESPIRATION RATE: 18 BRPM | DIASTOLIC BLOOD PRESSURE: 84 MMHG | SYSTOLIC BLOOD PRESSURE: 145 MMHG | TEMPERATURE: 98 F | OXYGEN SATURATION: 98 %

## 2019-09-27 DIAGNOSIS — Z98.84 BARIATRIC SURGERY STATUS: Chronic | ICD-10-CM

## 2019-09-27 PROCEDURE — 99283 EMERGENCY DEPT VISIT LOW MDM: CPT

## 2019-09-27 PROCEDURE — 99282 EMERGENCY DEPT VISIT SF MDM: CPT

## 2019-09-27 RX ORDER — DIPHENHYDRAMINE HCL 50 MG
25 CAPSULE ORAL ONCE
Refills: 0 | Status: COMPLETED | OUTPATIENT
Start: 2019-09-27 | End: 2019-09-27

## 2019-09-27 RX ADMIN — Medication 25 MILLIGRAM(S): at 19:57

## 2019-09-27 NOTE — ED PROVIDER NOTE - CLINICAL SUMMARY MEDICAL DECISION MAKING FREE TEXT BOX
58 y/o F with pmhx of Bipolar mood disorder was discharged at [facility] ED. Pt received a medication and experienced the side effect during her stay. Discussed with psychologist. In ER with discomfort to tongue, lip twitching, tongue stiffness, muscle stiffness. Pt appears well. PE shows no focal asymmetry and no focal neuro weakness. Pt was reassured. Will give benadryl, discharged, and follow up with her psychologist.

## 2019-09-27 NOTE — ED ADULT NURSE NOTE - OBJECTIVE STATEMENT
Pt arrived to the ED alert and oriented x 3 for complaint of allergic reaction from injection that she received from [facility], pt stated "My tongue feels heavy, my bottom lip feels like its twitching, I arms no longer feel stiff, I'm in no pain" Plan of care explained to patient, pt has no signs of distress nor discomfort, pt will continue to be monitored and reassessed

## 2019-09-27 NOTE — ED PROVIDER NOTE - PROGRESS NOTE DETAILS
case discussed with psychiatrist on call. Benadryl PO recommended, pt stable for dc. will f/u with her psychiatrist as scheduled.

## 2019-09-27 NOTE — ED ADULT NURSE NOTE - TEMPLATE LIST FOR HEAD TO TOE ASSESSMENT
General
Chronic leg pain  multiple knee surg. bilateral knee replacements 05/ 2012  Hypertension    Insomnia

## 2019-09-27 NOTE — ED ADULT TRIAGE NOTE - CHIEF COMPLAINT QUOTE
Patient states receives a monthly injection at [facility] for schizophrenia, states last received it 1 week ago, c/o of side effects from medication, lip twitching, tongue stiffness, muscle stiffness and weakness, symptoms noted 2 days after receiving the injection.  States had been warned that these may be side effects from the medication.  No SOB, no difficulty speaking in long sentences.

## 2019-09-27 NOTE — ED PROVIDER NOTE - ATTENDING CONTRIBUTION TO CARE
59F Bipolar, p/w concern of medication side effect - twitching, tongue stiffness, muscle stiffness onset 2d after injection. No n/v. No severe distress, airway compromise, speaking full sentences. No SI, no HI. Concern medication side effect, unlikely serotonin syndrome, consider extrapyramidal syndrome. Pt given benadryl, improved. Has plan for f/u w/ psych

## 2019-09-27 NOTE — ED ADULT NURSE NOTE - CHIEF COMPLAINT QUOTE
Patient states receives a monthly injection at [facility] for schizophrenia, states last received it 1 week ago, c/o of side effects from medication, lip twitching, tongue stiffness, muscle stiffness and weakness, symptoms noted 2 days after receiving the injection.  States had been warned that these may be side effects from the medication.  No SOB, no difficulty speaking in long sentences.

## 2019-09-27 NOTE — ED ADULT NURSE NOTE - CHPI ED NUR SYMPTOMS NEG
no tingling/no nausea/no chills/no weakness/no decreased eating/drinking/no dizziness/no fever/no pain/no vomiting

## 2019-09-27 NOTE — ED PROVIDER NOTE - PATIENT PORTAL LINK FT
You can access the FollowMyHealth Patient Portal offered by [facility] by registering at the following website: [email or web address]. By joining [facility] FollowMyHealth portal, you will also be able to view your health information using other applications (apps) compatible with our system.

## 2019-09-27 NOTE — ED PROVIDER NOTE - OBJECTIVE STATEMENT
60 y/o F with pmhx of Bipolar mood disorder pt states she receives a monthly injection at [facility], states last received it 1 week ago,  c/o of side effects from medication, lip twitching, tongue stiffness, muscle stiffness, symptoms noted 2 days after receiving the injection.  States had been warned that these may be side effects from the medication.  No difficulty speaking, nausea, vomiting.

## 2019-09-27 NOTE — ED ADULT NURSE NOTE - CARDIO ASSESSMENT
WDL
You were seen in the emergency department for fall. Please follow up with your primary doctor in the next 3-5 days. Take tylenol 650 mg every 6 hours as needed for continued pain. Apply Salonpas patch to the affected area. Return to the emergency department immediately if you experience difficulty breathing, fever or any other concerning symptoms.

## 2019-10-04 DIAGNOSIS — T50.905A ADVERSE EFFECT OF UNSPECIFIED DRUGS, MEDICAMENTS AND BIOLOGICAL SUBSTANCES, INITIAL ENCOUNTER: ICD-10-CM

## 2019-10-04 DIAGNOSIS — K13.0 DISEASES OF LIPS: ICD-10-CM

## 2020-06-05 NOTE — ED ADULT TRIAGE NOTE - SPO2 (%)
Triage: per mother two days ago pt developed temp of 99.1, t max last night 103. Mother staes pt was listless lying on bed staring at her, pt had tongue out for a few seconds, EMS was called and when they arrived pt \"was acting normal per mother. Pt was given Tylenol this am for fever of 101, nothing since then per mother. Pt has been acting normal today and eating and drinking.   Pt drinking bottle during triage 100

## 2021-04-14 ENCOUNTER — EMERGENCY (EMERGENCY)
Facility: HOSPITAL | Age: 61
LOS: 1 days | Discharge: HOME | End: 2021-04-14
Admitting: EMERGENCY MEDICINE
Payer: MEDICARE

## 2021-04-14 VITALS
RESPIRATION RATE: 18 BRPM | OXYGEN SATURATION: 96 % | DIASTOLIC BLOOD PRESSURE: 76 MMHG | TEMPERATURE: 98 F | HEART RATE: 80 BPM | SYSTOLIC BLOOD PRESSURE: 137 MMHG

## 2021-04-14 VITALS
HEART RATE: 88 BPM | TEMPERATURE: 98 F | RESPIRATION RATE: 20 BRPM | SYSTOLIC BLOOD PRESSURE: 172 MMHG | HEIGHT: 67 IN | OXYGEN SATURATION: 100 % | WEIGHT: 244.93 LBS | DIASTOLIC BLOOD PRESSURE: 88 MMHG

## 2021-04-14 DIAGNOSIS — Z98.84 BARIATRIC SURGERY STATUS: ICD-10-CM

## 2021-04-14 DIAGNOSIS — F31.9 BIPOLAR DISORDER, UNSPECIFIED: ICD-10-CM

## 2021-04-14 DIAGNOSIS — Z98.84 BARIATRIC SURGERY STATUS: Chronic | ICD-10-CM

## 2021-04-14 DIAGNOSIS — M25.552 PAIN IN LEFT HIP: ICD-10-CM

## 2021-04-14 DIAGNOSIS — Z79.899 OTHER LONG TERM (CURRENT) DRUG THERAPY: ICD-10-CM

## 2021-04-14 DIAGNOSIS — M54.42 LUMBAGO WITH SCIATICA, LEFT SIDE: ICD-10-CM

## 2021-04-14 LAB
ANION GAP SERPL CALC-SCNC: 11 MMOL/L — SIGNIFICANT CHANGE UP (ref 5–17)
APPEARANCE UR: CLEAR — SIGNIFICANT CHANGE UP
BASOPHILS # BLD AUTO: 0.01 K/UL — SIGNIFICANT CHANGE UP (ref 0–0.2)
BASOPHILS NFR BLD AUTO: 0.1 % — SIGNIFICANT CHANGE UP (ref 0–2)
BILIRUB UR-MCNC: NEGATIVE — SIGNIFICANT CHANGE UP
BUN SERPL-MCNC: 15 MG/DL — SIGNIFICANT CHANGE UP (ref 7–23)
CALCIUM SERPL-MCNC: 9.2 MG/DL — SIGNIFICANT CHANGE UP (ref 8.4–10.5)
CHLORIDE SERPL-SCNC: 104 MMOL/L — SIGNIFICANT CHANGE UP (ref 96–108)
CO2 SERPL-SCNC: 25 MMOL/L — SIGNIFICANT CHANGE UP (ref 22–31)
COLOR SPEC: YELLOW — SIGNIFICANT CHANGE UP
CREAT SERPL-MCNC: 0.81 MG/DL — SIGNIFICANT CHANGE UP (ref 0.5–1.3)
DIFF PNL FLD: NEGATIVE — SIGNIFICANT CHANGE UP
EOSINOPHIL # BLD AUTO: 0.14 K/UL — SIGNIFICANT CHANGE UP (ref 0–0.5)
EOSINOPHIL NFR BLD AUTO: 2.1 % — SIGNIFICANT CHANGE UP (ref 0–6)
GLUCOSE SERPL-MCNC: 109 MG/DL — HIGH (ref 70–99)
GLUCOSE UR QL: NEGATIVE — SIGNIFICANT CHANGE UP
HCT VFR BLD CALC: 32.5 % — LOW (ref 34.5–45)
HGB BLD-MCNC: 10.4 G/DL — LOW (ref 11.5–15.5)
IMM GRANULOCYTES NFR BLD AUTO: 0.3 % — SIGNIFICANT CHANGE UP (ref 0–1.5)
KETONES UR-MCNC: NEGATIVE — SIGNIFICANT CHANGE UP
LEUKOCYTE ESTERASE UR-ACNC: NEGATIVE — SIGNIFICANT CHANGE UP
LYMPHOCYTES # BLD AUTO: 2.19 K/UL — SIGNIFICANT CHANGE UP (ref 1–3.3)
LYMPHOCYTES # BLD AUTO: 32.3 % — SIGNIFICANT CHANGE UP (ref 13–44)
MCHC RBC-ENTMCNC: 29.3 PG — SIGNIFICANT CHANGE UP (ref 27–34)
MCHC RBC-ENTMCNC: 32 GM/DL — SIGNIFICANT CHANGE UP (ref 32–36)
MCV RBC AUTO: 91.5 FL — SIGNIFICANT CHANGE UP (ref 80–100)
MONOCYTES # BLD AUTO: 0.74 K/UL — SIGNIFICANT CHANGE UP (ref 0–0.9)
MONOCYTES NFR BLD AUTO: 10.9 % — SIGNIFICANT CHANGE UP (ref 2–14)
NEUTROPHILS # BLD AUTO: 3.69 K/UL — SIGNIFICANT CHANGE UP (ref 1.8–7.4)
NEUTROPHILS NFR BLD AUTO: 54.3 % — SIGNIFICANT CHANGE UP (ref 43–77)
NITRITE UR-MCNC: NEGATIVE — SIGNIFICANT CHANGE UP
NRBC # BLD: 0 /100 WBCS — SIGNIFICANT CHANGE UP (ref 0–0)
PH UR: 5.5 — SIGNIFICANT CHANGE UP (ref 5–8)
PLATELET # BLD AUTO: 337 K/UL — SIGNIFICANT CHANGE UP (ref 150–400)
POTASSIUM SERPL-MCNC: 4.1 MMOL/L — SIGNIFICANT CHANGE UP (ref 3.5–5.3)
POTASSIUM SERPL-SCNC: 4.1 MMOL/L — SIGNIFICANT CHANGE UP (ref 3.5–5.3)
PROT UR-MCNC: NEGATIVE MG/DL — SIGNIFICANT CHANGE UP
RBC # BLD: 3.55 M/UL — LOW (ref 3.8–5.2)
RBC # FLD: 15.2 % — HIGH (ref 10.3–14.5)
SODIUM SERPL-SCNC: 140 MMOL/L — SIGNIFICANT CHANGE UP (ref 135–145)
SP GR SPEC: >=1.03 — SIGNIFICANT CHANGE UP (ref 1–1.03)
UROBILINOGEN FLD QL: 0.2 E.U./DL — SIGNIFICANT CHANGE UP
WBC # BLD: 6.79 K/UL — SIGNIFICANT CHANGE UP (ref 3.8–10.5)
WBC # FLD AUTO: 6.79 K/UL — SIGNIFICANT CHANGE UP (ref 3.8–10.5)

## 2021-04-14 PROCEDURE — 80048 BASIC METABOLIC PNL TOTAL CA: CPT

## 2021-04-14 PROCEDURE — 99284 EMERGENCY DEPT VISIT MOD MDM: CPT

## 2021-04-14 PROCEDURE — 36415 COLL VENOUS BLD VENIPUNCTURE: CPT

## 2021-04-14 PROCEDURE — 81003 URINALYSIS AUTO W/O SCOPE: CPT

## 2021-04-14 PROCEDURE — 74176 CT ABD & PELVIS W/O CONTRAST: CPT

## 2021-04-14 PROCEDURE — 85025 COMPLETE CBC W/AUTO DIFF WBC: CPT

## 2021-04-14 PROCEDURE — 74176 CT ABD & PELVIS W/O CONTRAST: CPT | Mod: 26,MG

## 2021-04-14 PROCEDURE — 99284 EMERGENCY DEPT VISIT MOD MDM: CPT | Mod: 25

## 2021-04-14 PROCEDURE — G1004: CPT

## 2021-04-14 PROCEDURE — 87086 URINE CULTURE/COLONY COUNT: CPT

## 2021-04-14 RX ORDER — OXYCODONE AND ACETAMINOPHEN 5; 325 MG/1; MG/1
1 TABLET ORAL ONCE
Refills: 0 | Status: DISCONTINUED | OUTPATIENT
Start: 2021-04-14 | End: 2021-04-14

## 2021-04-14 RX ORDER — OXYCODONE AND ACETAMINOPHEN 5; 325 MG/1; MG/1
1 TABLET ORAL
Qty: 8 | Refills: 0
Start: 2021-04-14

## 2021-04-14 RX ADMIN — OXYCODONE AND ACETAMINOPHEN 1 TABLET(S): 5; 325 TABLET ORAL at 03:12

## 2021-04-14 NOTE — ED ADULT TRIAGE NOTE - DOMESTIC TRAVEL HIGH RISK QUESTION
Vaccine Information Sheet, \"Influenza - Inactivated\"  given to [employer], or parent/legal guardian of  [employer] and verbalized understanding. Patient responses:    Have you ever had a reaction to a flu vaccine? No  Are you able to eat eggs without adverse effects? Yes  Do you have any current illness? No  Have you ever had [place] Syndrome? No    Flu vaccine given per order. Please see immunization tab. No

## 2021-04-14 NOTE — ED PROVIDER NOTE - NS ED ROS FT
CONSTITUTIONAL: No fever, chills, or weakness  NEURO: No headache, no dizziness, no syncope; No focal weakness/tingling/numbness  EYES: No visual changes  ENT: No rhinorrhea or sore throat  PULM: No cough or dyspnea  CV: No chest pain or palpitations  GI: No abdominal pain, vomiting, or diarrhea  : chronic frequency and incontinence (see HPI)  MSK: HPI.  No neck pain, no joint pain.  SKIN: no rash or unusual bruising

## 2021-04-14 NOTE — ED PROVIDER NOTE - NSFOLLOWUPINSTRUCTIONS_ED_ALL_ED_FT
Ibuprofen 400 mg every 6-8 hours if needed for pain.  You can take Percocet 1 tablet every 6 hours if needed for more severe pain.   Follow up with spine specialist; we will have our Referral Coordinator help to arrange follow up.  Return to the Emergency Department if you have any new or worsening symptoms, or if you have any concerns.  ===========================    Lumbar Radiculopathy    WHAT YOU NEED TO KNOW:    Lumbar radiculopathy is a painful condition that happens when a nerve in your lumbar spine (lower back) is pinched or irritated. Nerves control feeling and movement in your body. You may have numbness or pain that shoots down from your lower back towards your foot.    DISCHARGE INSTRUCTIONS:    Medicines:   •Medicines:?NSAIDs, such as ibuprofen, help decrease swelling, pain, and fever. This medicine is available with or without a doctor's order. NSAIDs can cause stomach bleeding or kidney problems in certain people. If you take blood thinner medicine, always ask your healthcare provider if NSAIDs are safe for you. Always read the medicine label and follow directions.      ?Muscle relaxers help decrease pain and muscle spasms.      ?Opioids: This is a strong medicine given to reduce severe pain. It is also called narcotic pain medicine. Take this medicine exactly as directed by your healthcare provider.      ?Oral steroids: Steroids may also be given to reduce pain and swelling.      ?Take your medicine as directed. Contact your healthcare provider if you think your medicine is not helping or if you have side effects. Tell him of her if you are allergic to any medicine. Keep a list of the medicines, vitamins, and herbs you take. Include the amounts, and when and why you take them. Bring the list or the pill bottles to follow-up visits. Carry your medicine list with you in case of an emergency.          Follow up with your healthcare provider or spine specialist within 1 to 3 weeks: After your first follow-up appointment, return to your healthcare provider or spine specialist every 2 weeks until you have healed. Ask for information about physical therapy for your condition. Write down your questions so you remember to ask them during your visits.    Physical therapy: You may need physical therapy to improve your condition. Your physical therapist may teach you certain exercises to improve posture (the way you stand and sit), flexibility, and strength in your lower back.     Self care:   •Stay active: It is best to be active when you have lumbar radiculopathy. Your physical therapist or healthcare provider may tell you to take walks to ease yourself back into your daily routine. Avoid long periods of bed rest. Bed rest could worsen your symptoms. Do not move in ways that increase your pain. Ask for more information about the best ways to stay active.       •Use ice or heat packs: Use ice or heat packs as directed on the sore area of your body to decrease the pain and swelling. Put ice in a plastic bag covered with a towel on your low back. Cover heated items with a towel to avoid burns. Use ice and heat as directed.      •Avoid heavy lifting: Your condition may worsen if you lift heavy things. Avoid lifting if possible.      •Maintain a healthy weight: Excess body weight may strain your back. Talk with your healthcare provider about ways to lose excess weight if you are overweight.       Contact your healthcare provider or spine specialist if:   •Your pain does not improve within 1 to 3 weeks after treatment.      •Your pain and weakness keep you from your normal activities at work, home, or school.      •You lose more than 10 pounds in 6 months without trying.      •You become depressed or sad because of the pain.      •You have questions or concerns about your condition or care.      Return to the emergency department if:   •You have a fever greater than 100.4°F for longer than 2 days.      •You have new, severe back or leg pain, or your pain spreads to both legs.      •You have any new signs of numbness or weakness, especially in your lower back, legs, arms, or genital area.      •You have new trouble controlling your urine and bowel movements.      •You do not feel like your bladder empties when you urinate.

## 2021-04-14 NOTE — ED ADULT NURSE NOTE - OBJECTIVE STATEMENT
Patient to the Ed with complaint of worsening L leg, hip and thigh pain worsening over the past 2 days , denies present injury, endorsed that she suffer an injury in 2015, denies pain or burning on urination, any bowel dysfunction, numbness tingling.

## 2021-04-14 NOTE — ED PROVIDER NOTE - OBJECTIVE STATEMENT
60 yo fem with Hx chronic back pain due to work related injury years ago c/o pain that radiates from midline lumbar spine down around the left lower hip region, and down the anterior left thigh; this pain began about 2  days ago.  No recent lifting/fall/other precipitating injury.  The pain is different in character and location than her chronic back pain.  Has been taking ibuprofen 400 mg every few hours with some relief for the past two days; last dose was apx 9 hours ago.  She reports chronic urinary frequency and incontinence for 1-2 years for which she has seen specialists and wears Depends undergarments; this has not changed recently.  No dysuria or hematuria.  No fever or chills. No pain in abdomen or pelvis.  No weakness or numbness in the lower extremities. 62 yo fem with Hx chronic back pain due to work related injury years ago c/o pain that radiates from midline lumbar spine down around the left lower hip region, and down the anterior left thigh; this pain began about 2  days ago.  No recent lifting/fall/other precipitating injury.  The pain is different in character and location than her chronic back pain.  Pain in low back increases with any movement of torso, including deep breath.  No chest pain or dyspnea.  Has been taking ibuprofen 400 mg every few hours with some relief for the past two days; last dose was apx 9 hours ago.  She reports chronic urinary frequency and incontinence for 1-2 years for which she has seen specialists and wears Depends undergarments; this has not changed recently.  No dysuria or hematuria.  No fever or chills. No pain in abdomen or pelvis.  No weakness or numbness in the lower extremities.  No hx of IVDU, unexplained weight loss, malignancy, fever, or steroid use.

## 2021-04-14 NOTE — ED PROVIDER NOTE - CLINICAL SUMMARY MEDICAL DECISION MAKING FREE TEXT BOX
Pain in lower back radiating down left leg, seems mechanical in nature.  Hx of chronic urinary incontinence, low suspicion for cord compression or cauda equina syndrome.  Lower extremity strength, sensation, and rectal tone normal.  UA without signs of infection.  Suspect sciatica.

## 2021-04-14 NOTE — ED ADULT NURSE NOTE - NSIMPLEMENTINTERV_GEN_ALL_ED
Implemented All Universal Safety Interventions:  [place] to call system. Call bell, personal items and telephone within reach. Instruct patient to call for assistance. Room bathroom lighting operational. Non-slip footwear when patient is off stretcher. Physically safe environment: no spills, clutter or unnecessary equipment. Stretcher in lowest position, wheels locked, appropriate side rails in place.

## 2021-04-14 NOTE — ED PROVIDER NOTE - PHYSICAL EXAMINATION
CONSTITUTIONAL: WD,WN. NAD.    SKIN: Normal color and turgor.    HEAD: NC/AT.  EYES: Conjunctiva clear. EOMI. PERRL.    ENT: Airway clear. Normal voice.   RESPIRATORY:  Normal work of breathing. Lungs CTAB.  CARDIOVASCULAR:  RRR, S1S2. No M/R/G.      GI:  Abdomen soft, nontender.    MSK: Neck supple.  No lower extremity edema or calf tenderness.  No joint swelling or ROM limitation.  NEURO: Alert and oriented; CN II-XII grossly intact. Speech clear. 5/5 strength in all extremities.  Good balance. Steady gait. CONSTITUTIONAL: WD,WN. NAD.    SKIN: Normal color and turgor.    HEAD: NC/AT.  EYES: Conjunctiva clear. EOMI. PERRL.    ENT: Airway clear. Normal voice.   RESPIRATORY:  Normal work of breathing. Lungs CTAB.  CARDIOVASCULAR:  RRR, S1S2. No M/R/G.      GI:  Abdomen soft, nontender.    MSK: Neck supple.  No lower extremity edema or calf tenderness.  No joint swelling or ROM limitation.  NEURO: Alert and oriented; CN II-XII grossly intact. Speech clear. 5/5 strength in all extremities.  SILT throughout.  Patellar DTR intact.  No clonus.  Rectal tone strong.  Good balance. Steady gait.

## 2021-04-14 NOTE — ED ADULT TRIAGE NOTE - CHIEF COMPLAINT QUOTE
Pt reports L hip pain x2 days. Pt reports injury to L hip in 2015 and intermittent pain ever since then.

## 2021-04-15 LAB
CULTURE RESULTS: SIGNIFICANT CHANGE UP
SPECIMEN SOURCE: SIGNIFICANT CHANGE UP

## 2021-04-20 ENCOUNTER — APPOINTMENT (OUTPATIENT)
Dept: NEUROSURGERY | Facility: CLINIC | Age: 61
End: 2021-04-20

## 2021-04-21 ENCOUNTER — EMERGENCY (EMERGENCY)
Facility: HOSPITAL | Age: 61
LOS: 1 days | Discharge: HOME | End: 2021-04-21
Admitting: EMERGENCY MEDICINE
Payer: MEDICARE

## 2021-04-21 VITALS
OXYGEN SATURATION: 99 % | DIASTOLIC BLOOD PRESSURE: 98 MMHG | WEIGHT: 244.93 LBS | SYSTOLIC BLOOD PRESSURE: 176 MMHG | HEART RATE: 84 BPM | RESPIRATION RATE: 18 BRPM | HEIGHT: 67 IN | TEMPERATURE: 99 F

## 2021-04-21 DIAGNOSIS — Z98.84 BARIATRIC SURGERY STATUS: Chronic | ICD-10-CM

## 2021-04-21 DIAGNOSIS — M54.42 LUMBAGO WITH SCIATICA, LEFT SIDE: ICD-10-CM

## 2021-04-21 DIAGNOSIS — R32 UNSPECIFIED URINARY INCONTINENCE: ICD-10-CM

## 2021-04-21 DIAGNOSIS — M54.5 LOW BACK PAIN: ICD-10-CM

## 2021-04-21 PROCEDURE — 99283 EMERGENCY DEPT VISIT LOW MDM: CPT

## 2021-04-21 RX ORDER — IBUPROFEN 200 MG
600 TABLET ORAL ONCE
Refills: 0 | Status: COMPLETED | OUTPATIENT
Start: 2021-04-21 | End: 2021-04-21

## 2021-04-21 RX ORDER — OXYCODONE AND ACETAMINOPHEN 5; 325 MG/1; MG/1
1 TABLET ORAL ONCE
Refills: 0 | Status: DISCONTINUED | OUTPATIENT
Start: 2021-04-21 | End: 2021-04-21

## 2021-04-21 RX ORDER — IBUPROFEN 200 MG
1 TABLET ORAL
Qty: 12 | Refills: 0
Start: 2021-04-21

## 2021-04-21 RX ADMIN — Medication 600 MILLIGRAM(S): at 04:25

## 2021-04-21 RX ADMIN — OXYCODONE AND ACETAMINOPHEN 1 TABLET(S): 5; 325 TABLET ORAL at 03:20

## 2021-04-21 RX ADMIN — Medication 600 MILLIGRAM(S): at 03:33

## 2021-04-21 RX ADMIN — OXYCODONE AND ACETAMINOPHEN 1 TABLET(S): 5; 325 TABLET ORAL at 02:20

## 2021-04-21 NOTE — ED PROVIDER NOTE - PHYSICAL EXAMINATION
CONSTITUTIONAL: WD,WN. NAD.    SKIN: Normal color and turgor.    HEAD: NC/AT.  EYES: Conjunctiva clear. EOMI. PERRL.    ENT: Airway clear. Normal voice.   RESPIRATORY:  Normal work of breathing. Lungs CTAB.  CARDIOVASCULAR:  RRR, S1S2. No M/R/G.      GI:  Abdomen soft, nontender.    MSK: Neck supple.  No lower extremity edema or calf tenderness.  No joint swelling or ROM limitation.  NEURO: Alert and oriented; CN II-XII grossly intact. Speech clear. 5/5 strength in all extremities.  SILT throughout LE.  Good balance. Steady gait.  Patellar DTR intact.  No clonus of LE. Perianal sensation normal.  Rectal tone strong.

## 2021-04-21 NOTE — ED PROVIDER NOTE - OBJECTIVE STATEMENT
60 yo fem with Hx chronic low back pain, chronic urinary incontinence was seen here 1 week ago for suspected sciatica.  Had labs and CT imaging that did not show any acute pathology; there were no neuro deficits.  No change in chronic urinary incontinence.  Now returns saying her symptoms have not improved.  Still has lumbar pain radiating down left thigh.  No fever/chills, no LE weakness/numbness, no change in bladder/bowel habits.  She was scheduled to see neurosurgery Dr Amador yesterday but did not make it to the appointment because she had nobody to help her to car to get to appointment.  Tonight she came to ED by car service after her daughter helped her to the car.

## 2021-04-21 NOTE — ED PROVIDER NOTE - PATIENT PORTAL LINK FT
You can access the FollowMyHealth Patient Portal offered by [facility] by registering at the following website: [email or web address]. By joining [email or web address]’s FollowMyHealth portal, you will also be able to view your health information using other applications (apps) compatible with our system.

## 2021-04-21 NOTE — ED PROVIDER NOTE - CLINICAL SUMMARY MEDICAL DECISION MAKING FREE TEXT BOX
Sciatica - no change from last week. No neuro deficits, no new symptoms.  No fever to suggest infectious process.  Recent CT scan without significant pathology.  Will need to reschedule neurosurg follow up, missed appt yesterday.  Percocet made pt dizzy and nauseated, will switch to Lortab and ibuprofen.

## 2021-04-21 NOTE — ED ADULT NURSE NOTE - OBJECTIVE STATEMENT
60 yo fem with Hx chronic low back pain, chronic urinary incontinence was seen here 1 week ago for suspected sciatica.  Had labs and CT imaging that did not show any acute pathology; there were no neuro deficits.  No change in chronic urinary incontinence.  Now returns saying her symptoms have not improved.  Still has lumbar pain radiating down left thigh.  No fever/chills, no LE weakness/numbness, no change in bladder/bowel habits.  She was scheduled to see neurosurgery Dr Amador yesterday but did not make it to the appointment because she had nobody to help her to car to get to appointment.  Tonight she came to ED by car service after her daughter helped her to the car

## 2021-04-21 NOTE — ED PROVIDER NOTE - NSFOLLOWUPINSTRUCTIONS_ED_ALL_ED_FT
Use ibuprofen for pain.  For more severe pain, take Lortab (hydrocodone-apap) as directed (stop using oxycodone-apap).  Call Dr Amador' office this morning to reschedule your appointment.  Return to the Emergency Department if you have any new or worsening symptoms, or if you have any concerns.  ========================    Sciatica    WHAT YOU NEED TO KNOW:    Sciatica is a condition that causes pain along your sciatic nerve. The sciatic nerve runs from your spine through both sides of your buttocks. It then runs down the back of your thigh, into your lower leg and foot. Your sciatic nerve may be compressed, inflamed, irritated, or stretched.    DISCHARGE INSTRUCTIONS:    Medicines:   •NSAIDs: These medicines decrease swelling and pain. NSAIDs are available without a doctor's order. Ask your healthcare provider which medicine is right for you. Ask how much to take and when to take it. Take as directed. NSAIDs can cause stomach bleeding or kidney problems if not taken correctly.      •Acetaminophen: This medicine decreases pain. Acetaminophen is available without a doctor's order. Ask how much to take and when to take it. Follow directions. Acetaminophen can cause liver damage if not taken correctly.      •Muscle relaxers help decrease pain and muscle spasms.      •Take your medicine as directed. Contact your healthcare provider if you think your medicine is not helping or if you have side effects. Tell him of her if you are allergic to any medicine. Keep a list of the medicines, vitamins, and herbs you take. Include the amounts, and when and why you take them. Bring the list or the pill bottles to follow-up visits. Carry your medicine list with you in case of an emergency.      Follow up with your healthcare provider as directed: Write down your questions so you remember to ask them during your visits.     Manage your symptoms:   •Activity: Decrease your activity. Do not lift heavy objects or twist your back for at least 6 weeks. Slowly return to your usual activity.      •Ice: Ice helps decrease swelling and pain. Ice may also help prevent tissue damage. Use an ice pack, or put crushed ice in a plastic bag. Cover it with a towel and place it on your low back or leg for 15 to 20 minutes every hour or as directed.      •Heat: Heat helps decrease pain and muscle spasms. Apply heat on the area for 20 to 30 minutes every 2 hours for as many days as directed.       •Physical therapy: You may need to see physical therapist to teach you exercises to help improve movement and strength, and to decrease pain. An occupational therapist teaches you skills to help with your daily activities.       •Use assistive devices if directed: You may need to wear back support, such as a back brace. You may need crutches, a cane, or a walker to decrease stress on your lower back and leg muscles. Ask your healthcare provider for more information about assistive devices and how to use them correctly.      Self-care:   •Avoid pressure on your back and legs: Do not lift heavy objects, or stand or sit for long periods of time.      •Lift objects safely: Keep your back straight and bend your knees when you  an object. Do not bend or twist your back when you lift.      •Maintain a healthy weight: Ask your healthcare provider how much you should weigh. Ask him to help you create a weight loss plan if you are overweight.       •Exercise: Ask your healthcare provider about the best stretching, warmup, and exercise plan for you.       Contact your healthcare provider if:   •You have pain in your lower back at night or when resting.      •You have pain in your lower back with numbness below the knee.      •You have weakness in one leg only.      •You have questions or concerns about your condition or care.      Return to the emergency department if:   •You have trouble holding back your urine or bowel movements.      •You have weakness in both legs.      •You have numbness in your groin or buttocks.

## 2021-04-21 NOTE — ED PROVIDER NOTE - PROGRESS NOTE DETAILS
Still with significant pain on movement, and she says the percocet makes her nauseated and dizzy every time she takes it.  Will add NSAID. pain significantly improved.  walking with limp, will give cane.

## 2021-04-21 NOTE — ED PROVIDER NOTE - CARE PROVIDER_API CALL
Gualberto Amador)  Neurosurgery  [address], [address] [place] [address], [place] [address]  Phone: [phone]  Fax: [phone]  Follow Up Time: Urgent

## 2021-05-12 ENCOUNTER — EMERGENCY (EMERGENCY)
Facility: HOSPITAL | Age: 61
LOS: 1 days | Discharge: HOME | End: 2021-05-12
Attending: EMERGENCY MEDICINE | Admitting: EMERGENCY MEDICINE
Payer: MEDICARE

## 2021-05-12 VITALS
HEART RATE: 126 BPM | SYSTOLIC BLOOD PRESSURE: 136 MMHG | WEIGHT: 235.01 LBS | HEIGHT: 67 IN | TEMPERATURE: 98 F | DIASTOLIC BLOOD PRESSURE: 93 MMHG | RESPIRATION RATE: 18 BRPM | OXYGEN SATURATION: 99 %

## 2021-05-12 DIAGNOSIS — F10.10 ALCOHOL ABUSE, UNCOMPLICATED: ICD-10-CM

## 2021-05-12 DIAGNOSIS — Z98.84 BARIATRIC SURGERY STATUS: Chronic | ICD-10-CM

## 2021-05-12 DIAGNOSIS — F31.9 BIPOLAR DISORDER, UNSPECIFIED: ICD-10-CM
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
## 2021-05-12 DIAGNOSIS — F43.0 ACUTE STRESS REACTION: ICD-10-CM

## 2021-05-12 DIAGNOSIS — Z79.899 OTHER LONG TERM (CURRENT) DRUG THERAPY: ICD-10-CM

## 2021-05-12 PROCEDURE — 99283 EMERGENCY DEPT VISIT LOW MDM: CPT

## 2021-05-12 NOTE — ED ADULT TRIAGE NOTE - OTHER COMPLAINTS
patient BIBEMS ambulatory-- as per EMS and son, patient with hx of schizophrenia and bipolar, son reports patient has increased ETOH due to "increased stress at home" -- patient denies SI/HI, patient called 911 because, as per EMS, "she thought someone was following her"-- patient AOX3 (unable to state correct date, could state correct year, month, location)-- patient reports drinking 2 glasses of wine yesterday
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
## 2021-05-12 NOTE — ED PROVIDER NOTE - PSYCHIATRIC, MLM
oriented to person, place, time/situation Alert and oriented to person, place, time/situation. Speech is organized.

## 2021-05-12 NOTE — ED ADULT NURSE NOTE - OBJECTIVE STATEMENT
Patient is a 60yo F, BIBA, AAOx3, in NAD, VSS, presenting for psychiatric evaluation.  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  She also has been drinking recently."  Patient denies any SI/HI, AH/VH or any other complaints at this time.  Patient seems paranoid, constantly looking around ER.   Patient placed on 1:1 for patient and staff safety.
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
## 2021-05-12 NOTE — ED PROVIDER NOTE - PATIENT PORTAL LINK FT
You can access the FollowMyHealth Patient Portal offered by [facility] by registering at the following website: [email or web address]. By joining [employer]s FollowMyHealth portal, you will also be able to view your health information using other applications (apps) compatible with our system.

## 2021-10-12 NOTE — ED PROVIDER NOTE - IV ALTEPLASE ADMIN OUTSIDE HIDDEN
Quality 110: Preventive Care And Screening: Influenza Immunization: Influenza Immunization not Administered because Patient Refused.
Detail Level: Detailed
Quality 47: Advance Care Plan: Advance Care Planning discussed and documented in the medical record; patient did not wish or was not able to name a surrogate decision maker or provide an advance care plan.
Quality 226: Preventive Care And Screening: Tobacco Use: Screening And Cessation Intervention: Patient screened for tobacco use and is an ex/non-smoker
show

## 2022-01-11 ENCOUNTER — EMERGENCY (EMERGENCY)
Facility: HOSPITAL | Age: 62
LOS: 1 days | Discharge: HOME | End: 2022-01-11
Attending: EMERGENCY MEDICINE | Admitting: EMERGENCY MEDICINE
Payer: MEDICARE

## 2022-01-11 VITALS
TEMPERATURE: 98 F | OXYGEN SATURATION: 98 % | DIASTOLIC BLOOD PRESSURE: 85 MMHG | SYSTOLIC BLOOD PRESSURE: 138 MMHG | RESPIRATION RATE: 18 BRPM | HEART RATE: 95 BPM

## 2022-01-11 VITALS
WEIGHT: 229.94 LBS | DIASTOLIC BLOOD PRESSURE: 99 MMHG | HEIGHT: 67 IN | OXYGEN SATURATION: 99 % | HEART RATE: 110 BPM | TEMPERATURE: 98 F | RESPIRATION RATE: 16 BRPM | SYSTOLIC BLOOD PRESSURE: 195 MMHG

## 2022-01-11 DIAGNOSIS — R41.0 DISORIENTATION, UNSPECIFIED: ICD-10-CM

## 2022-01-11 DIAGNOSIS — Z98.84 BARIATRIC SURGERY STATUS: Chronic | ICD-10-CM

## 2022-01-11 DIAGNOSIS — Z00.00 ENCOUNTER FOR GENERAL ADULT MEDICAL EXAMINATION WITHOUT ABNORMAL FINDINGS: ICD-10-CM

## 2022-01-11 PROCEDURE — 99282 EMERGENCY DEPT VISIT SF MDM: CPT

## 2022-01-11 PROCEDURE — 99283 EMERGENCY DEPT VISIT LOW MDM: CPT
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
## 2022-01-11 NOTE — ED PROVIDER NOTE - NSFOLLOWUPINSTRUCTIONS_ED_ALL_ED_FT
PLEASE FOLLOW-UP WITH YOUR PCP IN 1-2 DAYS.    PLEASE RETURN TO THE EMERGENCY DEPARTMENT IF SEVERE HEADACHE, FEVER, CHEST PAIN, SHORTNESS OF BREATH, ABDOMINAL PAIN, VOMITING, OTHER CONCERNING SYMPTOMS.    PLEASE CONTACT MAURICIO JEWELL ([facility] EMERGENCY DEPARTMENT CLINICAL REFERRAL COORDINATOR) TO ASSIST IN SCHEDULING YOUR FOLLOW-UP APPOINTMENT.    Monday - Friday 11am-7pm  [phone]  [email or web address]
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
## 2022-01-11 NOTE — ED PROVIDER NOTE - PATIENT PORTAL LINK FT
You can access the FollowMyHealth Patient Portal offered by [facility] by registering at the following website: [email or web address]. By joining [employer]’s FollowMyHealth portal, you will also be able to view your health information using other applications (apps) compatible with our system.

## 2022-01-11 NOTE — ED PROVIDER NOTE - CLINICAL SUMMARY MEDICAL DECISION MAKING FREE TEXT BOX
avss. nontoxic. NAD. clinically sober. full capacity. no etoh/ivdu/trauma. no indication for emergent psych evaluation/labs/imaging at this time. will dc w/ outpatient pcp fu prn. strict return precautions. pt agrees w/ plan. questions answered.
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
## 2022-01-11 NOTE — ED PROVIDER NOTE - PHYSICAL EXAMINATION
CONST: nontoxic NAD speaking in full sentences  HEAD: atraumatic  EYES: conjunctivae clear, PERRL, EOMI  ENT: mmm  NECK: supple/FROM  CARD: rrr no murmurs  CHEST: ctab no r/r/w  ABD: soft, nd, nttp, no rebound/guarding  EXT: FROM, symmetric distal pulses intact  SKIN: warm, dry, no rash, cap refill <2sec  NEURO: a+ox3, CN II-XII intact, 5/5 strength x4, gross sensation intact x4, baseline gait  PSYCH: no SI/SA/HI/AH/VH
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
## 2022-01-11 NOTE — ED PROVIDER NOTE - PROGRESS NOTE DETAILS
pt w/ full capacity. no indication for emergent psych evaluation, labs or imaging at this time. nypd left -- pt not under arrest. feels safe going home. nypd left -- pt not under arrest. initially upset about being arrested, but able to verbally deescalate. now calm and cooperative. clinically sober. full capacity. no indication for emergent psych evaluation, labs or imaging at this time. feels safe going home.

## 2023-02-09 NOTE — ED ADULT NURSE NOTE - CAS DISC DELAYS ENTER DETAILS FT
Last office visit: 11/4/22  Next office visit: 11/8/23     Disp Refills Start End    HYDROcodone-acetaminophen (NORCO) 5-325 MG per tablet 60 tablet 0 01/9/2023     Sig - Route: Take 1 tablet by mouth every 4 hours as needed for Pain.. - Oral      Please review PDMP.   awaiting daughter

## 2023-04-12 NOTE — ED ADULT TRIAGE NOTE - PATIENT ON (OXYGEN DELIVERY METHOD)
Condition:: Follow up psoriasis located on the body throughout Please Describe Your Condition:: is an established patient who is being seen for a chief complaint of Follow up psoriasis located on the body throughout. The patient presents for a biologic injection. room air

## 2023-06-02 NOTE — ED ADULT TRIAGE NOTE - NS ED NURSE BANDS TYPE
[de-identified] : TTP midline spine and paraspinal musculature \par Strength                                         \par Hip flexor\par   Right: 5/5; Left: 5/5                             \par Knee extensor  \par   Right: 5/5; Left: 5/5                     \par Ankle dorsiflexion\par   Right: 5/5; Left: 5/5                  \par EHL        \par   Right: 5/5; Left: 5/5                                \par Ankle plantarflexion    \par   Right: 5/5; Left: 5/5\par \par Sensation\par L1\par   Right: 2/2; Left: 2/2\par L2\par   Right: 2/2; Left: 2/2\par L3\par   Right: 2/2; Left: 2/2\par L4\par   Right: 2/2; Left: 2/2\par L5\par   Right: 2/2; Left: 2/2\par S1\par   Right: 2/2; Left: 2/2\par \par Reflexes\par Patella\par   Right: 2+; Left 2+\par Achilles\par   Right: 2+; Left 2+\par Clonus\par  Right: absent; L: absent\par 
Name band;

## 2023-10-02 NOTE — ED PROVIDER NOTE - GENITOURINARY [+], MLM
frequency Propranolol Pregnancy And Lactation Text: This medication is Pregnancy Category C and it isn't known if it is safe during pregnancy. It is excreted in breast milk.

## 2025-02-18 NOTE — ED ADULT NURSE NOTE - NS ED NURSE LEVEL OF CONSCIOUSNESS AFFECT
Please review refill of:  Apremilast (Otezla) 30 MG Tab      Next office visit: 5/19/2025  Last office visit: 8/12/2024  F/u recommended: not noted     8/12/2024 Office visit notes:   ASSESSMENT AND PLAN:     1. Psoriasis  Chronic, improved. Continue Otezla 30 mg BID. Reviewed risks/benefits. Continue topical clobetasol ointment PRN during flares (using sparingly).      -     Apremilast     This medication does not meet refill protocol. Medication was NOT refilled.     Dr. Xiao-   Medication pending for provider approval. A new PA is needed. Please review, adjust and sign. Thank you.   Calm